# Patient Record
Sex: FEMALE | Race: WHITE
[De-identification: names, ages, dates, MRNs, and addresses within clinical notes are randomized per-mention and may not be internally consistent; named-entity substitution may affect disease eponyms.]

---

## 2017-11-01 ENCOUNTER — HOSPITAL ENCOUNTER (OUTPATIENT)
Dept: HOSPITAL 80 - FIMAGING | Age: 62
End: 2017-11-01
Payer: COMMERCIAL

## 2017-11-01 DIAGNOSIS — Z12.31: Primary | ICD-10-CM

## 2017-11-01 PROCEDURE — G0202 SCR MAMMO BI INCL CAD: HCPCS

## 2017-11-10 ENCOUNTER — HOSPITAL ENCOUNTER (OUTPATIENT)
Dept: HOSPITAL 80 - FIMAGING | Age: 62
End: 2017-11-10
Payer: COMMERCIAL

## 2017-11-10 DIAGNOSIS — R92.8: ICD-10-CM

## 2017-11-10 DIAGNOSIS — Z12.39: Primary | ICD-10-CM

## 2017-11-10 PROCEDURE — G0206 DX MAMMO INCL CAD UNI: HCPCS

## 2019-02-09 ENCOUNTER — HOSPITAL ENCOUNTER (EMERGENCY)
Dept: HOSPITAL 80 - FED | Age: 64
Discharge: HOME | End: 2019-02-09
Payer: COMMERCIAL

## 2019-02-09 VITALS — SYSTOLIC BLOOD PRESSURE: 145 MMHG | DIASTOLIC BLOOD PRESSURE: 69 MMHG

## 2019-02-09 DIAGNOSIS — E78.5: ICD-10-CM

## 2019-02-09 DIAGNOSIS — Z87.891: ICD-10-CM

## 2019-02-09 DIAGNOSIS — E03.9: ICD-10-CM

## 2019-02-09 DIAGNOSIS — M79.604: Primary | ICD-10-CM

## 2019-02-09 DIAGNOSIS — I10: ICD-10-CM

## 2019-02-09 NOTE — EDPHY
H & P


Stated Complaint: Upper right leg pain since yesterday


Time Seen by Provider: 02/09/19 11:17


HPI/ROS: 





Chief Complaint:  Right leg pain





HPI:  63-year-old woman right lateral leg.  Patient flu back from [place] 

yesterday.  She was sitting in the upper for quite a few hours.  Last night she 

started having cramping in her right foot which then progressed cramping in her 

calf.  Pain then migrated up to a right lateral head.  She has had aching and 

pain in that area sent.  Hurts to move.  It hurts to walk.  No numbness or 

weakness.  No swelling.  No back pain.  No prior injuries.  No chest pain 

shortness of breath.  No fevers or chills.





ROS:  10 systems were reviewed and were negative except those elements noted in 

the HPI.





PMH:  Hypothyroidism, hyperlipidemia, hypertension





Social History: No smoking, no alcohol,  no recreational drug use





Family History: non-contributory





Physical Exam:


Gen: Awake, Alert, No Distress


HEENT:  


     Nose: no rhinorrhea


     Eyes: PERRLA, EOMI


     Mouth: Moist mucosa 


Neck: Supple, no JVD


Chest: nontender, lungs clear to auscultation


Heart: S1, S2 normal, no murmur


Abd: Soft, non-tender, no guarding


Back: no CVA tenderness, no midline tenderness 


Ext: patient has tenderness to right lateral hip over the greater trochanter.  

No deformity.  No calf tenderness, no erythema, no edema


Skin: no rash


Neuro: CN II-XII intact, Sensation grossly intact, Strength 5/5 in bilateral 

upper and lower extremities








- Personal History


Current Tetanus Diphtheria and Acellular Pertussis (TDAP): Yes





- Medical/Surgical History


Hx Asthma: No


Hx Chronic Respiratory Disease: No


Hx Diabetes: No


Hx Cardiac Disease: No


Hx Renal Disease: No


Hx Cirrhosis: No


Hx Alcoholism: No


Hx HIV/AIDS: No


Hx Splenectomy or Spleen Trauma: No


Other PMH: arthritis, hypothyroidism, HTN, high cholesterol, ablation for 

ventricular tachycardia,





- Social History


Smoking Status: Former smoker


Constitutional: 


 Initial Vital Signs











Temperature (C)  36.6 C   02/09/19 10:58


 


Heart Rate  63   02/09/19 10:58


 


Respiratory Rate  16   02/09/19 10:58


 


Blood Pressure  158/85 H  02/09/19 10:58


 


O2 Sat (%)  98   02/09/19 10:58








 











O2 Delivery Mode               Room Air














Allergies/Adverse Reactions: 


 





No Known Allergies Allergy (Verified 01/20/16 08:57)


 








Home Medications: 














 Medication  Instructions  Recorded


 


Atenolol [Tenormin 25 mg (*)] 25 mg PO HS 01/13/16


 


Atorvastatin Calcium [Lipitor 10 10 mg PO HS 01/13/16





mg (*)]  


 


Cholecalciferol Vit D3 [Vitamin D3 1,000 units PO DAILY 01/13/16





(*)]  


 


Herbals/Supplements -Info Only 1 ea PO DAILY 01/13/16


 


Levothyroxine [Synthroid 75 mcg 75 mcg PO DAILY06 01/13/16





(*)]  


 


Multivitamins [Multivitamin (*)] 1 each PO DAILY 01/13/16














Medical Decision Making





- Diagnostics


Imaging Results: 


 Imaging Impressions





Extremity Venous Study  02/09/19 11:26


Impression:  No evidence of deep vein thrombosis in the right lower extremity.


 


Results communicated to Harsh Ling at 12:08 PM.








Hip X-Ray  02/09/19 11:26


Impression: Nothing acute identified. If symptoms persist consider hip MRI.











Imaging: Discussed imaging studies w/ On call Radiologist


ED Course/Re-evaluation: 





63-year-old woman with lateral hip pain.  Had a period of immobility yesterday.

  Ultrasound is negative for DVT.  X-rays negative for acute fracture.  Will 

discharge with follow-up with primary care physician in a few days for recheck, 

return for any concerns.





Departure





- Departure


Disposition: Home, Routine, Self-Care


Clinical Impression: 


 Leg pain





Condition: Good


Instructions:  Leg Pain (ED)


Additional Instructions: 


You may take ibuprofen, 600 mg 3 times a day.


Make sure you take the ibuprofen with food.


Apply ice for 15 min of every hour while awake.


Follow up with primary care physician in 3-4 days if symptoms are not improving.


Referrals: 


Rajni Craig MD [Primary Care Provider] - As per Instructions